# Patient Record
Sex: MALE | Race: WHITE | Employment: FULL TIME | URBAN - NONMETROPOLITAN AREA
[De-identification: names, ages, dates, MRNs, and addresses within clinical notes are randomized per-mention and may not be internally consistent; named-entity substitution may affect disease eponyms.]

---

## 2020-08-13 ENCOUNTER — HOSPITAL ENCOUNTER (EMERGENCY)
Age: 70
Discharge: HOME | End: 2020-08-13
Attending: EMERGENCY MEDICINE
Payer: MEDICARE

## 2020-08-13 VITALS
BODY MASS INDEX: 27.16 KG/M2 | HEIGHT: 66 IN | OXYGEN SATURATION: 99 % | TEMPERATURE: 98 F | WEIGHT: 169 LBS | RESPIRATION RATE: 16 BRPM | DIASTOLIC BLOOD PRESSURE: 79 MMHG | SYSTOLIC BLOOD PRESSURE: 168 MMHG | HEART RATE: 77 BPM

## 2020-08-13 PROCEDURE — 99282 EMERGENCY DEPT VISIT SF MDM: CPT

## 2020-08-13 RX ORDER — PRAVASTATIN SODIUM 40 MG
40 TABLET ORAL DAILY
COMMUNITY

## 2020-08-13 RX ORDER — LORATADINE 10 MG/1
10 TABLET ORAL DAILY
COMMUNITY

## 2020-08-13 RX ORDER — TRAZODONE HYDROCHLORIDE 100 MG/1
100 TABLET ORAL NIGHTLY
COMMUNITY

## 2020-08-13 RX ORDER — LISINOPRIL 10 MG/1
10 TABLET ORAL 2 TIMES DAILY
COMMUNITY

## 2020-08-13 RX ORDER — NAPROXEN SODIUM 220 MG/1
1 TABLET ORAL DAILY
COMMUNITY

## 2020-08-13 RX ORDER — ASPIRIN 81 MG/1
81 TABLET, CHEWABLE ORAL DAILY
COMMUNITY

## 2020-08-13 RX ORDER — OFLOXACIN 3 MG/ML
5 SOLUTION AURICULAR (OTIC) 2 TIMES DAILY
Qty: 5 ML | Refills: 0 | Status: SHIPPED | OUTPATIENT
Start: 2020-08-13 | End: 2020-08-23

## 2020-08-13 RX ORDER — COVID-19 ANTIGEN TEST
KIT MISCELLANEOUS DAILY
COMMUNITY

## 2020-08-13 RX ORDER — GABAPENTIN 300 MG/1
300 CAPSULE ORAL 2 TIMES DAILY
COMMUNITY

## 2020-08-13 RX ORDER — NIFEDIPINE 10 MG/1
10 CAPSULE ORAL 2 TIMES DAILY
COMMUNITY

## 2020-08-13 RX ORDER — NATEGLINIDE 120 MG/1
120 TABLET ORAL 2 TIMES DAILY
COMMUNITY

## 2020-08-13 RX ORDER — OMEPRAZOLE 10 MG/1
10 CAPSULE, DELAYED RELEASE ORAL
COMMUNITY

## 2020-08-13 ASSESSMENT — ENCOUNTER SYMPTOMS
WHEEZING: 0
BACK PAIN: 0
SHORTNESS OF BREATH: 0
TROUBLE SWALLOWING: 0
NAUSEA: 0
FACIAL SWELLING: 0
STRIDOR: 0
PHOTOPHOBIA: 0
VOMITING: 0
BLOOD IN STOOL: 0
ABDOMINAL PAIN: 0
COLOR CHANGE: 0
VOICE CHANGE: 0

## 2020-08-13 ASSESSMENT — PAIN SCALES - GENERAL: PAINLEVEL_OUTOF10: 1

## 2020-08-13 ASSESSMENT — PAIN DESCRIPTION - DESCRIPTORS: DESCRIPTORS: HEADACHE

## 2020-08-13 ASSESSMENT — PAIN DESCRIPTION - LOCATION: LOCATION: HEAD

## 2020-08-13 ASSESSMENT — PAIN DESCRIPTION - PAIN TYPE: TYPE: CHRONIC PAIN

## 2020-08-13 ASSESSMENT — PAIN DESCRIPTION - FREQUENCY: FREQUENCY: INTERMITTENT

## 2020-08-13 NOTE — ED PROVIDER NOTES
[facility]  eMERGENCY dEPARTMENT eNCOUnter      Pt Name: Myrtie Dakin  MRN: [id number]  Armstrongfradha 1950  Date of evaluation: 8/13/2020  Provider: La Mckay MD    [address] [place] [address]       Chief Complaint   Patient presents with    Ear Problem     pt states he has both ears packed of wax woke up with not being able to hear this morning and feels this is making his equilibrum? off          HISTORY OF PRESENT ILLNESS   (Location/Symptom, Timing/Onset, Context/Setting, Quality, Duration, Modifying Factors, Severity)  Note limiting factors. Myrtie Dakin is a 72 y.o. male he does have a documented history of CVA however denies any neurologic deficits who presents due to 1 day of disequilibrium and bilateral decreased hearing. He reports that he often has cerumen impactions of his bilateral eardrums and believes that that is what is happening today. He reports that he does feel like his balance is off and his hearing is decreased. He reports his symptoms are moderate, constant, and worsening. He denies any known aggravating relieving factors. Denies any other neurologic deficits specifically denying facial droop, trouble speaking, weakness, numbness. He denies any fever or infectious symptoms. HPI    Nursing Notes were reviewed. REVIEW OFSYSTEMS    (2-9 systems for level 4, 10 or more for level 5)     Review of Systems   Constitutional: Negative for appetite change, fever and unexpected weight change. HENT: Positive for hearing loss. Negative for facial swelling, trouble swallowing and voice change. Eyes: Negative for photophobia and visual disturbance. Respiratory: Negative for shortness of breath, wheezing and stridor. Cardiovascular: Negative for chest pain and palpitations. Gastrointestinal: Negative for abdominal pain, blood in stool, nausea and vomiting. Genitourinary: Negative for difficulty urinating and dysuria.    Musculoskeletal: Negative for back pain, gait problem and neck pain. Skin: Negative for color change and wound. Neurological: Positive for dizziness. Negative for seizures, syncope and speech difficulty. Psychiatric/Behavioral: Negative for self-injury and suicidal ideas. Except as noted above the remainder of the review of systems was reviewed and negative. PAST MEDICAL HISTORY     Past Medical History:   Diagnosis Date    Cerebral artery occlusion with cerebral infarction ([facility]     Diabetes mellitus ([facility]     Hyperlipidemia     Hypertension          SURGICAL HISTORY       Past Surgical History:   Procedure Laterality Date    CARDIAC SURGERY           CURRENT MEDICATIONS       Previous Medications    ASPIRIN 81 MG CHEWABLE TABLET    Take 81 mg by mouth daily    B COMPLEX-C-FOLIC ACID (EQL SUPER B COMPLEX/VITAMIN C) TABS    Take 1 tablet by mouth daily    GABAPENTIN (NEURONTIN) 300 MG CAPSULE    Take 300 mg by mouth 2 times daily. LISINOPRIL (PRINIVIL;ZESTRIL) 10 MG TABLET    Take 10 mg by mouth 2 times daily    LORATADINE (CLARITIN) 10 MG TABLET    Take 10 mg by mouth daily    METFORMIN (GLUCOPHAGE) 500 MG TABLET    Take 500 mg by mouth 2 times daily (with meals)    METOPROLOL TARTRATE (LOPRESSOR) 25 MG TABLET    Take 25 mg by mouth 2 times daily    MULTIPLE VITAMIN (MULTIVITAMIN PO)    Take by mouth    NAPROXEN SODIUM (ALEVE) 220 MG CAPS    Take by mouth daily    NATEGLINIDE (STARLIX) 120 MG TABLET    Take 120 mg by mouth 2 times daily    NIFEDIPINE (PROCARDIA) 10 MG CAPSULE    Take 10 mg by mouth 2 times daily    OMEPRAZOLE (PRILOSEC) 10 MG DELAYED RELEASE CAPSULE    Take 10 mg by mouth    PRAVASTATIN (PRAVACHOL) 40 MG TABLET    Take 40 mg by mouth daily    TRAZODONE (DESYREL) 100 MG TABLET    Take 100 mg by mouth nightly       ALLERGIES     Penicillins and Azithromycin    FAMILY HISTORY     History reviewed. No pertinent family history.        SOCIAL HISTORY       Social History     Socioeconomic History    Marital status: Single     Spouse name: None    Number of children: None    Years of education: None    Highest education level: None   Occupational History    None   Social Needs    Financial resource strain: None    Food insecurity     Worry: None     Inability: None    Transportation needs     Medical: None     Non-medical: None   Tobacco Use    Smoking status: Never Smoker    Smokeless tobacco: Never Used   Substance and Sexual Activity    Alcohol use: Not Currently    Drug use: Not Currently    Sexual activity: None   Lifestyle    Physical activity     Days per week: None     Minutes per session: None    Stress: None   Relationships    Social connections     Talks on phone: None     Gets together: None     Attends Church service: None     Active member of club or organization: None     Attends meetings of clubs or organizations: None     Relationship status: None    Intimate partner violence     Fear of current or ex partner: None     Emotionally abused: None     Physically abused: None     Forced sexual activity: None   Other Topics Concern    None   Social History Narrative    None         PHYSICAL EXAM    (up to 7 for level 4, 8 or more for level 5)     ED Triage Vitals [08/13/20 1113]   BP Temp Temp Source Pulse Resp SpO2 Height Weight   (!) 188/53 98 °F (36.7 °C) Oral 57 16 100 % 5' 6\" (1.676 m) 169 lb (76.7 kg)       Physical Exam  Vitals signs and nursing note reviewed. Constitutional:       General: He is not in acute distress. Appearance: He is well-developed. Comments: Pleasant elderly  male in no acute distress   HENT:      Head: Normocephalic and atraumatic. Ears:      Comments: Bilateral cerumen impaction. TMs unable to visualize on initial exam  Eyes:      Conjunctiva/sclera: Conjunctivae normal.   Neck:      Musculoskeletal: Neck supple. Vascular: No JVD. Trachea: No tracheal deviation. Cardiovascular:      Rate and Rhythm: Normal rate.    Pulmonary: Effort: Pulmonary effort is normal. No respiratory distress. Breath sounds: Normal breath sounds. No wheezing. Abdominal:      General: There is no distension. Palpations: Abdomen is soft. Tenderness: There is no abdominal tenderness. There is no guarding or rebound. Musculoskeletal: Normal range of motion. General: No tenderness. Skin:     General: Skin is warm and dry. Neurological:      General: No focal deficit present. Mental Status: He is alert and oriented to person, place, and time. Cranial Nerves: No cranial nerve deficit. Comments: Patient is alert and oriented to person, place, time, situation. Mildly hard of hearing. Normal symmetric smile. No facial droop. Normal speech. 5 of 5 strength in all 4 extremities. Normal gait on own power. EMERGENCY DEPARTMENT COURSE and DIFFERENTIAL DIAGNOSIS/MDM:   Vitals:    Vitals:    08/13/20 1113 08/13/20 1412   BP: (!) 188/53 (!) 178/88   Pulse: 57 62   Resp: 16 16   Temp: 98 °F (36.7 °C)    TempSrc: Oral    SpO2: 100% 98%   Weight: 169 lb (76.7 kg)    Height: 5' 6\" (1.676 m)          MDM  Significant consideration is given to possible CVA or central cause of vertigo and I discussed this at length with the patient however he reports that he is fairly certain that his symptoms are due to earwax blockage in his bilateral ears and that instead of having a work-up he requested his ears to be cleaned and then be reevaluated to see if his symptoms resolve. I feel this is reasonable. The nurse uses Debrox and saline flushes and removes a very large amount of cerumen from bilateral ears. Repeat ear exam does show a small perforation in his right TM but a normal left TM. The patient reports complete relief of all of his symptoms. He no longer feels any disequilibrium when ambulating. He has had no neurologic deficits.   Based on his TM perforation I have recommended ofloxacin eardrops but other than the eardrops have recommended complete dry ear precautions including cessation of the swimming, baths, and using cotton balls in his ears when he showers. I also recommended close ENT follow-up. Strict ER return precautions given for any new or worsening symptoms. The patient expresses understanding and agreement with this plan and is discharged home. I estimate there is low risk for acute coronary syndrome, intracranial hemorrhage, malignant dysrhythmia, meningitis, pericardial tamponade, pneumothorax, pneumonia, pulmonary embolism, sepsis, subarachnoid hemorrhage, subdural hematoma, or stroke thus I consider the discharge disposition reasonable. We have discussed the diagnosis and risks, and we agree with discharging home to follow-up with their primary doctor. We also discussed returning to the Emergency Department immediately if new or worsening symptoms occur. We have discussed the symptoms which are most concerning (e.g., changing or worsening pain, weakness, vomiting, fever) that necessitate immediate return. Procedures    FINAL IMPRESSION      1. Bilateral impacted cerumen    2. Vertigo          DISPOSITION/PLAN   DISPOSITION Decision To Discharge 08/13/2020 03:08:29 PM      PATIENT REFERRED TO:  Carmen Marr MD  [address] [place] [address] Drive [address] [phone]    In 1 week  Vertigo, ear    [facility] Emergency Department  [address] [place] [address],[address]  [phone]    If symptoms worsen      DISCHARGE MEDICATIONS:  New Prescriptions    OFLOXACIN (FLOXIN) 0.3 % OTIC SOLUTION    Place 5 drops into both ears 2 times daily for 10 days          (Please note that portions of this note were completed with a voice recognition program.  Efforts were made to edit the dictations but occasionally words aremis-transcribed. )    Kerrie Mujica MD (electronically signed)  Attending Emergency Physician           Kerrie Mujica MD  08/13/20 6630

## 2020-08-13 NOTE — ED NOTES
Nurse has flushed both ears after multiple attempts has finally been successful. Pt tolerated well.       Ana Bishop RN  08/13/20 1213

## 2020-08-17 ENCOUNTER — OFFICE VISIT (OUTPATIENT)
Dept: ENT CLINIC | Age: 70
End: 2020-08-17
Payer: MEDICARE

## 2020-08-17 ENCOUNTER — PROCEDURE VISIT (OUTPATIENT)
Dept: AUDIOLOGY | Age: 70
End: 2020-08-17
Payer: MEDICARE

## 2020-08-17 VITALS
HEIGHT: 66 IN | HEART RATE: 67 BPM | DIASTOLIC BLOOD PRESSURE: 56 MMHG | TEMPERATURE: 97.1 F | SYSTOLIC BLOOD PRESSURE: 149 MMHG | BODY MASS INDEX: 26.36 KG/M2 | WEIGHT: 164 LBS

## 2020-08-17 VITALS — TEMPERATURE: 97 F

## 2020-08-17 PROCEDURE — 4040F PNEUMOC VAC/ADMIN/RCVD: CPT | Performed by: OTOLARYNGOLOGY

## 2020-08-17 PROCEDURE — G8417 CALC BMI ABV UP PARAM F/U: HCPCS | Performed by: OTOLARYNGOLOGY

## 2020-08-17 PROCEDURE — 99203 OFFICE O/P NEW LOW 30 MIN: CPT | Performed by: OTOLARYNGOLOGY

## 2020-08-17 PROCEDURE — 1123F ACP DISCUSS/DSCN MKR DOCD: CPT | Performed by: OTOLARYNGOLOGY

## 2020-08-17 PROCEDURE — 3017F COLORECTAL CA SCREEN DOC REV: CPT | Performed by: OTOLARYNGOLOGY

## 2020-08-17 PROCEDURE — G8427 DOCREV CUR MEDS BY ELIG CLIN: HCPCS | Performed by: OTOLARYNGOLOGY

## 2020-08-17 PROCEDURE — 92557 COMPREHENSIVE HEARING TEST: CPT | Performed by: AUDIOLOGIST

## 2020-08-17 PROCEDURE — 1036F TOBACCO NON-USER: CPT | Performed by: OTOLARYNGOLOGY

## 2020-08-17 PROCEDURE — 92567 TYMPANOMETRY: CPT | Performed by: AUDIOLOGIST

## 2020-08-17 RX ORDER — FLUTICASONE PROPIONATE 50 MCG
2 SPRAY, SUSPENSION (ML) NASAL DAILY
Qty: 1 BOTTLE | Refills: 3 | Status: SHIPPED | OUTPATIENT
Start: 2020-08-17

## 2020-08-17 ASSESSMENT — ENCOUNTER SYMPTOMS
APNEA: 0
FACIAL SWELLING: 0
VOICE CHANGE: 0
SINUS PRESSURE: 0
SORE THROAT: 0
EYE ITCHING: 0
COUGH: 0
SHORTNESS OF BREATH: 0
TROUBLE SWALLOWING: 0

## 2020-08-17 NOTE — PROGRESS NOTES
Roger Reddy   1950, 72 y.o. male   [id number]       Referring Provider: Piotr Ricardo DO  Referral Type: In an order in [address] [place] [address]    Reason for Visit: Evaluation of suspected change in hearing, tinnitus, or balance. ADULT AUDIOLOGIC EVALUATION      Roger Reddy is a 72 y.o. male seen today, 8/17/2020 , for an initial audiologic evaluation. Patient was seen by Piotr Ricardo DO following today's evaluation. AUDIOLOGIC AND OTHER PERTINENT MEDICAL HISTORY:      Roger Reddy noted otalgia, aural fullness, tinnitus, imbalance, history of ocupational noise exposure and family history of hearing loss. Patient reports a perforation in the right ear. He states that last Thursday he noticed he was not hearing well out of the right ear. This was accompained by otalgia, pressure, and disequilibrium. Patient notes that after going to the ED and having his ears cleaned the hearing has improved AU. He still notices some right sided otalgia. Patient has bilateral tinnitus that is intermittent in nature. 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 Patient notes his daughter was born with a hearing loss. Roger Reddy denied otorrhea, dizziness, history of falls, history of head trauma and history of ear surgery. Date: 8/17/2020     IMPRESSIONS:        AD:  Hearing WNL sloping to Severe SNHL, Good WRS, Type A tymp  AS: Hearing WNL sloping to Moderate SNHL, Good WRS, Type A tymp    Hearing loss consistent with sensorineural hearing loss secondary to presbycusis and noise exposure. Hearing loss significant enough to create hearing difficulty in most listening situations. Discussed hearing loss, tinnitus, hearing aids, and imbalance with patient. Advised patient to follow up with Leia Reese to discuss hearing aid options at a [facility].  Patient to follow medical recommendations per Piotr Ricardo DO .    ASSESSMENT AND FINDINGS:     Otoscopy revealed: Ear canals clear of cerumen bilaterally    RIGHT EAR:  Hearing Sensitivity: Normal hearing sensitivity to Severe sensorineural hearing loss  Speech Recognition Threshold: 35 dB HL  Word Recognition: Good (80-89%), based on NU-6 25-word list at 75m dBHL using recorded speech stimuli. Tympanometry: Normal peak pressure and compliance, Type A tympanogram, consistent with normal middle ear function. Acoustic Reflexes: Ipsilateral: Could not maintain seal. Contralateral: Could not maintain seal.    LEFT EAR:  Hearing Sensitivity: Normal hearing sensitivity Moderate sensorineural hearing loss  Speech Recognition Threshold: 40 dB HL  Word Recognition: Good (80-89%), based on NU-6 25-word list at 75m dBHL using recorded speech stimuli. Tympanometry: Normal peak pressure and compliance, Type A tympanogram, consistent with normal middle ear function. Acoustic Reflexes: Ipsilateral: Could not maintain seal. Contralateral: Could not maintain seal.    Reliability: Good  Transducer: Inserts    See scanned audiogram dated 8/17/2020  for results. PATIENT EDUCATION:       The following items were discussed with the patient:    - Good Communication Strategies,    - Hearing Loss and Hearing Aids,    - Tinnitus Management Strategies,    - Noise-Induced Hearing Loss and use of Hearing Protection Devices (HPDs),    - Fall Risk and Prevention   - Dizziness    Educational information was shared in the After Visit Summary.                                                 RECOMMENDATIONS:                                                                                                                                                                                                                                                            The following items are recommended based on patient report and results from today's appointment:   - Continue medical follow-up with Eric García DO.   - Retest hearing as medically

## 2020-08-17 NOTE — PATIENT INSTRUCTIONS
a \"Hearing Aid Evaluation\" with an audiologist to discuss your lifestyle, features of hearing aid technology, and styles of hearing aids available. It is recommended that you contact your insurance company to determine if you have a hearing aid benefit, as this may dictate who you can see for these services. · Have hearing tests as your doctor suggests. They can show whether your hearing has changed. Your hearing aid may need to be adjusted. · Use other assistive devices as needed. These may include:  ? Telephone amplifiers and hearing aids that can connect to a television, stereo, radio, or microphone. ? Devices that use lights or vibrations. These alert you to the doorbell, a ringing telephone, or a baby monitor. ? Television closed-captioning. This shows the words at the bottom of the screen. Most new TVs can do this. ? TTY (text telephone). This lets you type messages back and forth on the telephone instead of talking or listening. These devices are also called TDD. When messages are typed on the keyboard, they are sent over the phone line to a receiving TTY. The message is shown on a monitor. · Use pagers, fax machines, text, and email if it is hard for you to communicate by telephone. · Try to learn a listening technique called speech-reading. It is not lip-reading. You pay attention to people's gestures, expressions, posture, and tone of voice. These clues can help you understand what a person is saying. Face the person you are talking to, and have him or her face you. Make sure the lighting is good. You need to see the other person's face clearly. · Think about counseling if you need help to adjust to your hearing loss. When should you call for help? Watch closely for changes in your health, and be sure to contact your doctor if:    · You think your hearing is getting worse. · You have new symptoms, such as dizziness or nausea.            Tinnitus: Overview and Management sound and not let it bother you. Relax using biofeedback, meditation, or yoga. Feeling stressed and being tired can make tinnitus worse. · Play music or white noise to help you sleep. Background noise may cover up the noise that you hear in your ears. You can buy a tabletop machine or a device that sits under your pillow to play soothing sounds, like ocean waves. · Smart phones have free apps, such as Whist, Relax Melodies, ReSound Relief, and White Noise Lite. These apps have different types of sounds/noise, some of which you can blend together to find sounds that are most soothing to you. · Hearing aid technology, especially when there is some hearing loss, may help reduce tinnitus symptoms by giving your brain better access to the sounds it is missing. There are some hearing aids with built-in noise generator programs, which may help when amplification alone is not enough. Additional resources may be found through the American Tinnitus Association at www.ana maria.org    When should you call for help? Call 911 anytime you think you may need emergency care. For example, call if:    · You have symptoms of a stroke. These may include:  ? Sudden numbness, tingling, weakness, or loss of movement in your face, arm, or leg, especially on only one side of your body. ? Sudden vision changes. ? Sudden trouble speaking. ? Sudden confusion or trouble understanding simple statements. ? Sudden problems with walking or balance. ? A sudden, severe headache that is different from past headaches. Call your doctor now or seek immediate medical care if:    · You develop other symptoms. These may include hearing loss (or worse hearing loss), balance problems, dizziness, nausea, or vomiting. Watch closely for changes in your health, and be sure to contact your doctor if:    · Your tinnitus moves from both ears to one ear. · Your hearing loss gets worse within 1 day after an ear injury.      · Your tinnitus or earwax and fluid draining from the ear. Their small size may be hard for some people to handle. They are not made for children. You have some options if you have a hearing problem and are thinking about getting hearing aids. You can go to your doctor or an audiologist. He or she will do a hearing test and help you decide which type and style of hearing aid may be best for you. What else should I know about hearing aids? Find out if your insurance covers hearing aids. They can be expensive. Different types of hearing aids come with different costs. Also find out about a warranty or return policy in case you are not happy with your hearing aids. Follow-up care is a key part of your treatment and safety. Be sure to make and go to all appointments, and call your doctor if you are having problems. It's also a good idea to know your test results and keep a list of the medicines you take. Where can you learn more? Go to https://MetaModix.[employer]. org and sign in to your [employer] account. Enter Q501 in the [employer] box to learn more about \"Learning About Hearing Aids. \"     If you do not have an account, please click on the \"Sign Up Now\" link. Current as of: October 21, 2018  Content Version: 12.1  © 5918-7761 Healthwise, Incorporated. Care instructions adapted under license by [facility] ([facility]). If you have questions about a medical condition or this instruction, always ask your healthcare professional. [place] [address] any warranty or liability for your use of this information. Noise-Induced Hearing Loss  What it is, and what you can do to prevent it      Exposure to loud sounds, in an occupational setting or recreational, can cause permanent hearing loss. Sound is measured in decibels (dB). Noise-induced hearing loss is the ONLY type of preventable hearing loss. Hearing loss related to noise exposure can occur at any age.       There are small sensory cells, called inner and outer hair cells, within the inner ear (cochlea). These cells process the loudness (intensity) and pitch (frequency) of sound and send the signal to the brain via our auditory nerve (vestibulocochlear nerve, cranial nerve VIII). When these cells are damaged, they can result in permanent hearing loss and/or tinnitus. The hair cells responsible for high frequency sounds, like birds chirping, are most likely to be damaged due to loud sounds. The high frequency sounds are also very important for our clarity and understanding of speech. OCCUPATIONAL NOISE EXPOSURE RECREATIONAL NOISE EXPOSURE   Some jobs may have exposure to loud sounds in the workplace. These jobs may include but are not limited to:  [employer]   Construction   Welding   Landscaping   Hairdressing/hairstyling   Musicians  [employer]    ... And more! Many activities outside of work may cause permanent hearing loss. These activities may include but are not limited to:  Lawnmowers, leaf blowers  [employer] (such as pigs squealing)   Chainsaws and other power tools  [employer] musical instruments and/or singing   Listening to music too loudly - at concerts, through stereo, through ear buds or headphones   Attending sporting events   Attending fireworks shows or using fireworks at home  [employer] of firearms   . .. And more! REDUCE OR PROTECT YOUR EARS FROM NOISE EXPOSURE    To do your best to avoid noise-induced hearing loss, here are some tips:   Limit exposure to loud sounds. 85 dB (decibels) is safe for 8 hours. As sounds are louder, the length of time the sound is safe lessens. These numbers are cumulative across a 24-hour period.   (NIOSH and CDC, 2002)  o 85 dB is safe for 8 hours  o 88 dB is safe for 4 hours  o 91 dB is safe for 2 hours  o 94 dB is safe for 1 hour  o 97 dB is safe for 30 minutes  o 100 dB is safe for 15 minutes  o 103 dB is safe for 7.5 minutes  o 106 dB is safe for 3.75 minutes  o 109 dB is safe for LESS THAN 2 minutes  o 112 dB is safe for LESS THAN 1 minute  o 115 dB is safe for ~ 30 seconds  o 130 dB can cause IMMEDIATE hearing loss   If you are unsure if a sound is too loud, consider checking the sound level with a \"sound level meter\". There are apps on smart devices that can measure the loudness of the sound. They are not as accurate as expensive equipment used by scientists, but it will give you a guesstimate of how loud the sound is, and if it may be damaging to your hearing.  If you cannot avoid loud sounds, here are ways to reduce your exposure:  o 1. Wear hearing protection  - Ear plugs and protective ear muffs can be used to reduce the intensity of the sound. The higher the NRR (noise reduction rating), the better reduction of the intensity of the sound   o 2. Turn the volume down  - When listening to music, turn the volume down, especially when wearing ear buds or headphones. A good rule of thumb is to not go beyond the middle setting on your device. If you can't hear someone talking to you from arm's length away, your music may be at a level that it can cause damage. If someone else can hear your music from 3 feet away, it may also be at a level that it can cause damage. o 3. Walk away from the sound  - If you do not have the ability to wear hearing protection or turn down the volume of the sound, you should do your best to move away from the source of the sound. - Sound decreases in intensity as we move further from the source. The sound will decrease by 6 dB for every doubling of distance from the sound source. Exposure to these sounds may cause permanent damage to your hearing.   If you suspect your hearing has changed, it is recommended that you have your hearing tested by your audiologist.    Patient Education        Preventing Falls: Care Instructions  Your Care Instructions     Getting around your home safely can be a challenge if you have injuries or health problems that make it easy for you to fall. Loose rugs and furniture in walkways are among the dangers for many older people who have problems walking or who have poor eyesight. People who have conditions such as arthritis, osteoporosis, or dementia also have to be careful not to fall. You can make your home safer with a few simple measures. Follow-up care is a key part of your treatment and safety. Be sure to make and go to all appointments, and call your doctor if you are having problems. It's also a good idea to know your test results and keep a list of the medicines you take. How can you care for yourself at home? Taking care of yourself  · You may get dizzy if you do not drink enough water. To prevent dehydration, drink plenty of fluids, enough so that your urine is light yellow or clear like water. Choose water and other caffeine-free clear liquids. If you have kidney, heart, or liver disease and have to limit fluids, talk with your doctor before you increase the amount of fluids you drink. · Exercise regularly to improve your strength, muscle tone, and balance. Walk if you can. Swimming may be a good choice if you cannot walk easily. · Have your vision and hearing checked each year or any time you notice a change. If you have trouble seeing and hearing, you might not be able to avoid objects and could lose your balance. · Know the side effects of the medicines you take. Ask your doctor or pharmacist whether the medicines you take can affect your balance. Sleeping pills or sedatives can affect your balance. · Limit the amount of alcohol you drink. Alcohol can impair your balance and other senses. · Ask your doctor whether calluses or corns on your feet need to be removed. If you wear loose-fitting shoes because of calluses or corns, you can lose your balance and fall. · Talk to your doctor if you have numbness in your feet.   Preventing falls at home  · Remove raised doorway thresholds, throw rugs, and clutter. Repair loose carpet or raised areas in the floor. · Move furniture and electrical cords to keep them out of walking paths. · Use nonskid floor wax, and wipe up spills right away, especially on ceramic tile floors. · If you use a walker or cane, put rubber tips on it. If you use crutches, clean the bottoms of them regularly with an abrasive pad, such as steel wool. · Keep your house well lit, especially [place], and outside walkways. Use night-lights in areas such as hallways and bathrooms. Add extra light switches or use remote switches (such as switches that go on or off when you clap your hands) to make it easier to turn lights on if you have to get up during the night. · Install sturdy handrails on stairways. · Move items in your cabinets so that the things you use a lot are on the lower shelves (about waist level). · Keep a cordless phone and a flashlight with new batteries by your bed. If possible, put a phone in each of the main rooms of your house, or carry a cell phone in case you fall and cannot reach a phone. Or, you can wear a device around your neck or wrist. You push a button that sends a signal for help. · Wear low-heeled shoes that fit well and give your feet good support. Use footwear with nonskid soles. Check the heels and soles of your shoes for wear. Repair or replace worn heels or soles. · Do not wear socks without shoes on wood floors. · Walk on the grass when the sidewalks are slippery. If you live in an area that gets snow and ice in the winter, sprinkle salt on slippery steps and sidewalks. Preventing falls in the bath  · Install grab bars and nonskid mats inside and outside your shower or tub and near the toilet and sinks. · Use shower chairs and bath benches. · Use a hand-held shower head that will allow you to sit while showering.   · Get into a tub or shower by putting the weaker leg in first. Get out of a tub or shower with your strong side first.  · Repair loose toilet seats and consider installing a raised toilet seat to make getting on and off the toilet easier. · Keep your bathroom door unlocked while you are in the shower. Where can you learn more? Go to https://chpepiceweb.Portapure. org and sign in to your [employer] account. Enter 0476 [id number] in the [employer] box to learn more about \"Preventing Falls: Care Instructions. \"     If you do not have an account, please click on the \"Sign Up Now\" link. Current as of: August 7, 2019               Content Version: 12.5  © 6167-7763 Healthwise, [employer]. Care instructions adapted under license by [facility] ([facility]). If you have questions about a medical condition or this instruction, always ask your healthcare professional. [place] [address] any warranty or liability for your use of this information. Dizziness: Care Instructions  Your Care Instructions  Dizziness is the feeling of unsteadiness or fuzziness in your head. It is different than having vertigo, which is a feeling that the room is spinning or that you are moving or falling. It is also different from lightheadedness, which is the feeling that you are about to faint. It can be hard to know what causes dizziness. Some people feel dizzy when they have migraine headaches. Sometimes bouts of flu can make you feel dizzy. Some medical conditions, such as heart problems or high blood pressure, can make you feel dizzy. Many medicines can cause dizziness, including medicines for high blood pressure, pain, or anxiety. If a medicine causes your symptoms, your doctor may recommend that you stop or change the medicine. If it is a problem with your heart, you may need medicine to help your heart work better. If there is no clear reason for your symptoms, your doctor may suggest watching and waiting for a while to see if the dizziness goes away on its own.     Follow-up care is a key part of your treatment and safety. Be sure to make and go to all appointments, and call your doctor if you are having problems. It's also a good idea to know your test results and keep a list of the medicines you take. How can you care for yourself at home? · If your doctor recommends or prescribes medicine, take it exactly as directed. Call your doctor if you think you are having a problem with your medicine. · Do not drive while you feel dizzy. · Try to prevent falls. Steps you can take include:  ? Using nonskid mats, adding grab bars near the tub, and using night-lights. ? Clearing your home so that walkways are free of anything you might trip on.  ? Letting family and friends know that you have been feeling dizzy. This will help them know how to help you. When should you call for help? Call 911 anytime you think you may need emergency care. For example, call if:    · You passed out (lost consciousness). · You have dizziness along with symptoms of a heart attack. These may include:  ? Chest pain or pressure, or a strange feeling in the chest.  ? Sweating. ? Shortness of breath. ? Nausea or vomiting. ? Pain, pressure, or a strange feeling in the back, neck, jaw, or upper belly or in one or both shoulders or arms. ? Lightheadedness or sudden weakness. ? A fast or irregular heartbeat. · You have symptoms of a stroke. These may include:  ? Sudden numbness, tingling, weakness, or loss of movement in your face, arm, or leg, especially on only one side of your body. ? Sudden vision changes. ? Sudden trouble speaking. ? Sudden confusion or trouble understanding simple statements. ? Sudden problems with walking or balance. ? A sudden, severe headache that is different from past headaches. Call your doctor now or seek immediate medical care if:    · You feel dizzy and have a fever, headache, or ringing in your ears. · You have new or increased nausea and vomiting. · Your dizziness does not go away or comes back. Watch closely for changes in your health, and be sure to contact your doctor if:    · You do not get better as expected. Where can you learn more? Go to https://Dhir Diamondspepiceweb.McPhy. org and sign in to your [employer] account. Enter D730 in the [employer] box to learn more about \"Dizziness: Care Instructions. \"     If you do not have an account, please click on the \"Sign Up Now\" link. Current as of: September 23, 2018  Content Version: 11.9  © 5550-5065 [employer], Incorporated. Care instructions adapted under license by [facility] ([facility]). If you have questions about a medical condition or this instruction, always ask your healthcare professional. [address] any warranty or liability for your use of this information.

## 2020-08-17 NOTE — PROGRESS NOTES
Franko Matamoros 01, [address], [address] [place] [address]  P: [phone]       Patient     Brock Cooper  1950    ChiefComplaint     Chief Complaint   Patient presents with    Ear Problem     last thursday and \"was practically deaf\" and was told to go to urgent care by paulette peterson. Cleaned out some wax and was told his right ear \"may have a rupture\"    Dizziness     Has vertigo, has been worse since having his ears cleaned on thursday. History of Present Illness     Leanna Jurado 61-year-old male here today for evaluation of bilateral hearing loss for assessment of possible tympanic membrane perforation. Reports needing his ears cleaned every so often that when they become clogged he develops his sense of imbalance. Was seen in urgent care and had his ears irrigated bilaterally significant improvement in left ear but reports persistent fullness in the right. Denies previous ear surgeries, otorrhea, vertigo. Significant noise exposure through his life. Does feel he has hearing loss in both ears. Past Medical History     Past Medical History:   Diagnosis Date    Cerebral artery occlusion with cerebral infarction ([address]     Diabetes mellitus ([address]     Hyperlipidemia     Hypertension        Past Surgical History     Past Surgical History:   Procedure Laterality Date    CARDIAC SURGERY         Family History     No family history on file.     Social History     Social History     Tobacco Use    Smoking status: Former Smoker     Types: Cigarettes     Last attempt to quit: 2000     Years since quittin.6    Smokeless tobacco: Never Used   Substance Use Topics    Alcohol use: Not Currently    Drug use: Not Currently        Allergies     Allergies   Allergen Reactions    Penicillins Itching    Azithromycin Nausea And Vomiting       Medications     Current Outpatient Medications   Medication Sig Dispense Refill    fluticasone (FLONASE) 50 MCG/ACT nasal spray 2 sprays by Each Nostril route daily 1 Bottle 3    metFORMIN (GLUCOPHAGE) 500 MG tablet Take 500 mg by mouth 2 times daily (with meals)      omeprazole (PRILOSEC) 10 MG delayed release capsule Take 10 mg by mouth      B Complex-C-Folic Acid (EQL SUPER B COMPLEX/VITAMIN C) TABS Take 1 tablet by mouth daily      aspirin 81 MG chewable tablet Take 81 mg by mouth daily      Multiple Vitamin (MULTIVITAMIN PO) Take by mouth      traZODone (DESYREL) 100 MG tablet Take 100 mg by mouth nightly      loratadine (CLARITIN) 10 MG tablet Take 10 mg by mouth daily      gabapentin (NEURONTIN) 300 MG capsule Take 300 mg by mouth 2 times daily.  NIFEdipine (PROCARDIA) 10 MG capsule Take 10 mg by mouth 2 times daily      Naproxen Sodium (ALEVE) 220 MG CAPS Take by mouth daily      metoprolol tartrate (LOPRESSOR) 25 MG tablet Take 25 mg by mouth 2 times daily      nateglinide (STARLIX) 120 MG tablet Take 120 mg by mouth 2 times daily      pravastatin (PRAVACHOL) 40 MG tablet Take 40 mg by mouth daily      lisinopril (PRINIVIL;ZESTRIL) 10 MG tablet Take 10 mg by mouth 2 times daily      ofloxacin (FLOXIN) 0.3 % otic solution Place 5 drops into both ears 2 times daily for 10 days 5 mL 0     No current facility-administered medications for this visit. Review of Systems     Review of Systems   Constitutional: Negative for appetite change, chills, fatigue, fever and unexpected weight change. HENT: Positive for congestion and hearing loss. Negative for ear discharge, ear pain, facial swelling, nosebleeds, postnasal drip, sinus pressure, sneezing, sore throat, tinnitus, trouble swallowing and voice change. Eyes: Negative for itching. Respiratory: Negative for apnea, cough and shortness of breath. Gastrointestinal:        Negative for dysphasia   Endocrine: Negative for cold intolerance and heat intolerance. Musculoskeletal: Negative for myalgias and neck pain. Skin: Negative for rash.    Allergic/Immunologic: Negative for environmental allergies. Neurological: Negative for dizziness and headaches. Psychiatric/Behavioral: Negative for confusion, decreased concentration and sleep disturbance. PhysicalExam     Vitals:    08/17/20 1341 08/17/20 1350   BP: (!) 158/61 (!) 149/56   Site: Right Upper Arm Right Upper Arm   Position: Sitting Sitting   Cuff Size: Medium Adult Medium Adult   Pulse: 67 67   Temp: 97.1 °F (36.2 °C)    TempSrc: Infrared    Weight: 164 lb (74.4 kg)    Height: 5' 6\" (1.676 m)        Physical Exam  Constitutional:       General: He is not in acute distress. Appearance: He is well-developed. HENT:      Head: Normocephalic and atraumatic. Right Ear: Tympanic membrane, ear canal and external ear normal. No drainage. No middle ear effusion. There is impacted cerumen. Left Ear: Tympanic membrane, ear canal and external ear normal. No drainage. No middle ear effusion. Tympanic membrane is not bulging. Tympanic membrane has normal mobility. Nose: No septal deviation, mucosal edema or rhinorrhea. Mouth/Throat:      Lips: Pink. Mouth: Mucous membranes are moist.      Dentition: Has dentures. Pharynx: Uvula midline. Tonsils: No tonsillar exudate. 1+ on the right. 1+ on the left. Eyes:      Pupils: Pupils are equal, round, and reactive to light. Neck:      Musculoskeletal: Full passive range of motion without pain. Thyroid: No thyroid mass or thyromegaly. Trachea: Trachea and phonation normal.   Cardiovascular:      Pulses: Normal pulses. Pulmonary:      Effort: Pulmonary effort is normal. No accessory muscle usage or respiratory distress. Breath sounds: No stridor. Lymphadenopathy:      Head:      Right side of head: No submental or submandibular adenopathy. Left side of head: No submental or submandibular adenopathy. Cervical: No cervical adenopathy. Right cervical: No superficial, deep or posterior cervical adenopathy. Left cervical: No superficial, deep or posterior cervical adenopathy. Skin:     General: Skin is warm and dry. Neurological:      Mental Status: He is alert and oriented to person, place, and time. Cranial Nerves: No cranial nerve deficit. Coordination: Coordination normal.      Gait: Gait normal.   Psychiatric:         Thought Content: Thought content normal.           Procedure     Removal Impacted Cerumen    An operating microscope was utilized to visualize the external auditory canals using a 3mm speculum. Cerumen was removed with curettes and brown suctions on the right side. The tympanic membrane is intact. No fluid visualized in the middle ear. No complications. Assessment and Plan     1. Sensorineural hearing loss (SNHL) of both ears  -audiogram reviewed-bilateral downsloping normal to severe high-frequency sensorineural hearing loss  -Would benefit greatly from hearing aids  - Christiano Bran, Audiology, [facility]    2. Nasal congestion  -reports nightly congestion  - fluticasone (FLONASE) 50 MCG/ACT nasal spray; 2 sprays by Each Nostril route daily  Dispense: 1 Bottle; Refill: 3    3. Impacted cerumen of right ear  -removed with curette and suction  -Small amount of retained cerumen anterior superior, recommend he use Debrox daily for the next week      Return as needed    Carlos Levy DO  8/17/20      Portions of this note were dictated using Dragon.  There may be linguistic errors secondary to the use of this program.